# Patient Record
Sex: FEMALE | Race: WHITE
[De-identification: names, ages, dates, MRNs, and addresses within clinical notes are randomized per-mention and may not be internally consistent; named-entity substitution may affect disease eponyms.]

---

## 2019-06-10 ENCOUNTER — HOSPITAL ENCOUNTER (OUTPATIENT)
Dept: HOSPITAL 46 - OPS | Age: 64
Discharge: HOME | End: 2019-06-10
Attending: SURGERY
Payer: COMMERCIAL

## 2019-06-10 VITALS — HEIGHT: 64 IN | WEIGHT: 100.99 LBS | BODY MASS INDEX: 17.24 KG/M2

## 2019-06-10 DIAGNOSIS — Z12.11: Primary | ICD-10-CM

## 2019-06-10 DIAGNOSIS — K21.9: ICD-10-CM

## 2019-06-10 DIAGNOSIS — I10: ICD-10-CM

## 2019-06-10 DIAGNOSIS — Z98.890: ICD-10-CM

## 2019-06-10 DIAGNOSIS — K21.0: ICD-10-CM

## 2019-06-10 DIAGNOSIS — D50.9: ICD-10-CM

## 2019-06-10 DIAGNOSIS — Z87.891: ICD-10-CM

## 2019-06-10 PROCEDURE — G0500 MOD SEDAT ENDO SERVICE >5YRS: HCPCS

## 2019-06-10 PROCEDURE — 0DJD8ZZ INSPECTION OF LOWER INTESTINAL TRACT, VIA NATURAL OR ARTIFICIAL OPENING ENDOSCOPIC: ICD-10-PCS | Performed by: SURGERY

## 2019-06-10 NOTE — OR
[facility] [place] [facility]
                                    [address], [place]  [address]
_________________________________________________________________________________________
                                                                 Signed   
 
 
DATE OF OPERATION:
06/10/2019
 
SURGEON:
Melecio Landeros MD
 
PREOPERATIVE DIAGNOSIS:
Colon screening (normal colonoscopy in 2008).
 
POSTOPERATIVE DIAGNOSIS:
Normal colon to cecum.
 
PROCEDURE PERFORMED:
Total colonoscopy to cecum.
 
ANESTHESIA:
Intravenous sedation, fentanyl 100 mcg, Versed 7 mg.
 
INDICATION:
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
underwent colonoscopy in 2008, which was normal.  She has no family history of colon
cancer or other problems and is symptom-free.  She is here for surveillance colonoscopy.
 She understands the risks of bleeding, infection, and perforation related to
colonoscopy and wished to proceed. 
 
FINDINGS:
The prep was quite excellent.  Complete colonoscopy was undertaken to the cecum without
question.  There was no evidence of polyps, diverticular formation, colitis, or cancer.
The colon was redundant and time to cecum was somewhat lengthy with extra care and
caution but was accomplished ultimately safely. 
 
DESCRIPTION OF PROCEDURE:
The patient was brought to the endoscopy suite and placed in lateral decubitus position,
given intravenous sedation to the point of slurred speech and nystagmus with full
cardiopulmonary monitoring.  Digital rectal examination was normal. 
 
An Olympus video colonoscope was passed in the rectum and manipulated throughout the
colon.  Passage through the sigmoid and left colon was somewhat lengthy on the basis of
colon redundancy, but the prep was quite excellent and in due course, the scope passed
beyond this area, ultimately to the cecum.  The ileocecal valve and appendiceal orifice
were normal.  Scope was withdrawn from that point and examination undertaken showed no
sign of abnormality, specifically no polyps, diverticular formation, colitis, or cancer.
 
    Electronically Signed By: MELECIO LANDEROS MD  06/10/19 1429
_________________________________________________________________________________________
PATIENT NAME:     TARA MCCLAIN                
MEDICAL RECORD #: [id number]            OPERATIVE REPORT              
          ACCT #: [id number]  
DATE OF BIRTH:   [birth year]            REPORT #: [id number]      
PHYSICIAN:        MELECIO LANDEROS MD                 
PCP:              BO GAMBINO PA-C         
REPORT IS CONFIDENTIAL AND NOT TO BE RELEASED WITHOUT AUTHORIZATION
 
 
                                  [facility]
                                    [address], [place]  [address]
_________________________________________________________________________________________
                                                                 Signed   
 
 
 Retroflex view of the rectum was normal.  The scope was removed.  The patient was taken
to recovery room in good condition. 
 
CONCLUDING DIAGNOSIS:
Normal colon.
 
PLAN:
Repeat colonoscopy in 10 years sooner if clinically indicated.  She will return to the
ongoing care of UMESH Alatorre, and Dr. Lizzie Bird. 
 
 
 
            ________________________________________
            MD MAY Medrano/MODL
Job #:  [id number]
DD:  06/10/2019 08:11:47
DT:  06/10/2019 14:12:14
 
cc:            UMESH Alatorre MD
 
 
Copies:  LIZZIE BIRD MD
~
 
 
 
 
 
 
 
 
 
 
 
 
 
 
 
    Electronically Signed By: MELECIO LANDEROS MD  06/10/19 1429
_________________________________________________________________________________________
PATIENT NAME:     TARA MCCLAIN                
MEDICAL RECORD #: [id number]            OPERATIVE REPORT              
          ACCT #: [id number]  
DATE OF BIRTH:   [birth year]            REPORT #: [id number]      
PHYSICIAN:        MELECIO LANDEROS MD                 
PCP:              BO GAMBINO PA-C         
REPORT IS CONFIDENTIAL AND NOT TO BE RELEASED WITHOUT AUTHORIZATION

## 2019-06-10 NOTE — NUR
PT ALERT, ORIENTED AND SUPPORTED BY HER . SHE HAS HAD PREVIOUS SCOPE,
JUST ROUTINE TODAY.  PT SEEMED INFORMED, EXPLAINED PROCESS OF EVENTS FOR THE
DAY-PTS'  SEEMED RELIEVED. PT REQUESTED PRAYER, WILL FOLLOW AS NEEDED

## 2019-06-10 NOTE — NUR
06/10/19 0811 Christel Adkins
0805- PT ARRIVES TO PACU ALERT. PT REPORTS NO PAIN OR NAUSEA. RESP
EVEN AND UNLABORED. OXYGEN SAT MID TO HIGH 90'S ON RA. PT DROWSY AND
FALLS TO SLEEP WHEN NOT BEING TALKED TO.